# Patient Record
Sex: FEMALE | Race: WHITE | NOT HISPANIC OR LATINO | URBAN - METROPOLITAN AREA
[De-identification: names, ages, dates, MRNs, and addresses within clinical notes are randomized per-mention and may not be internally consistent; named-entity substitution may affect disease eponyms.]

---

## 2022-07-05 ENCOUNTER — OFFICE VISIT (OUTPATIENT)
Dept: URBAN - METROPOLITAN AREA CLINIC 78 | Facility: CLINIC | Age: 57
End: 2022-07-05

## 2022-07-05 NOTE — HPI-TODAY'S VISIT:
This is a colonoscopy follow-up appointment for this patient, a 53 year old /White female, after undergoing a colonoscopy to the cecum approximately 2 weeks ago, which revealed a benign polyp. The sessile polyp was located in the colon. The pathology report states benign hyperplastic polyp. Since the procedure, the patient has been without complaints. This is a follow-up appointment for this patient, a 53 year old /White female, after a previous visit approximately 2 weeks ago, for an evaluation for abdominal pain. The cause has been felt to be functional abdominal pain. She reports that the pain is controlled for the most part with occasional flare. The patient admits to no other complaints. The patient has had further work up which included the following studies: a EGD. The EGD revealed gastritis.  Summary of prior workup: - EGD/colonoscopy on 1/25/2019 by Dr. Gil Flores: Normal esophagus, gastric erosions, normal duodenum (unremarkable biopsies).  On colonoscopy she had a 7 mm HP polyp in the descending, 4 mm HP polyp in the rectum.  Sigmoid and descending diverticuli.  Quality of the prep was good.  A repeat colonoscopy was advised in 10 years